# Patient Record
Sex: FEMALE | URBAN - METROPOLITAN AREA
[De-identification: names, ages, dates, MRNs, and addresses within clinical notes are randomized per-mention and may not be internally consistent; named-entity substitution may affect disease eponyms.]

---

## 2019-06-12 PROBLEM — Z00.00 ENCOUNTER FOR PREVENTIVE HEALTH EXAMINATION: Status: ACTIVE | Noted: 2019-06-12

## 2023-02-17 ENCOUNTER — OFFICE (OUTPATIENT)
Dept: URBAN - METROPOLITAN AREA CLINIC 105 | Facility: CLINIC | Age: 68
Setting detail: OPHTHALMOLOGY
End: 2023-02-17
Payer: MEDICARE

## 2023-02-17 DIAGNOSIS — H52.7: ICD-10-CM

## 2023-02-17 DIAGNOSIS — H16.223: ICD-10-CM

## 2023-02-17 DIAGNOSIS — H25.13: ICD-10-CM

## 2023-02-17 DIAGNOSIS — H35.3131: ICD-10-CM

## 2023-02-17 DIAGNOSIS — H02.403: ICD-10-CM

## 2023-02-17 DIAGNOSIS — E11.9: ICD-10-CM

## 2023-02-17 PROCEDURE — 92014 COMPRE OPH EXAM EST PT 1/>: CPT | Performed by: OPHTHALMOLOGY

## 2023-02-17 PROCEDURE — 92250 FUNDUS PHOTOGRAPHY W/I&R: CPT | Performed by: OPHTHALMOLOGY

## 2023-02-17 PROCEDURE — 92015 DETERMINE REFRACTIVE STATE: CPT | Performed by: OPHTHALMOLOGY

## 2023-02-17 ASSESSMENT — REFRACTION_CURRENTRX
OS_VPRISM_DIRECTION: PROGS
OS_CYLINDER: SPH
OS_CYLINDER: SPH
OD_VPRISM_DIRECTION: PROGS
OD_OVR_VA: 20/
OD_AXIS: 075
OS_VPRISM_DIRECTION: SV
OD_OVR_VA: 20/
OS_AXIS: 180
OD_CYLINDER: -0.50
OS_CYLINDER: SPH
OS_SPHERE: +1.50
OD_CYLINDER: SPH
OD_VPRISM_DIRECTION: SV
OD_SPHERE: +1.50
OS_OVR_VA: 20/
OD_SPHERE: +4.75
OD_CYLINDER: -0.50
OS_OVR_VA: 20/
OS_SPHERE: +5.25
OD_SPHERE: +4.75
OS_VPRISM_DIRECTION: SV
OD_AXIS: 075
OS_OVR_VA: 20/
OD_VPRISM_DIRECTION: SV
OS_SPHERE: +5.25
OS_AXIS: 180
OD_AXIS: 180
OD_OVR_VA: 20/

## 2023-02-17 ASSESSMENT — REFRACTION_MANIFEST
OD_ADD: +2.50
OS_SPHERE: +2.75
OD_CYLINDER: -0.75
OD_AXIS: 080
OD_SPHERE: +3.00
OS_VA1: 20/20-
OS_SPHERE: +2.75
OD_CYLINDER: -0.75
OS_CYLINDER: SPH
OS_VA1: 20/25
OD_SPHERE: +3.00
OD_VA1: 20/20-2
OD_VA1: 20/25
OD_ADD: +2.75
OS_ADD: +2.75
OD_AXIS: 075
OS_ADD: +2.50

## 2023-02-17 ASSESSMENT — VISUAL ACUITY
OS_BCVA: 20/70
OD_BCVA: 20/150

## 2023-02-17 ASSESSMENT — KERATOMETRY
OS_AXISANGLE_DEGREES: 073
OS_K2POWER_DIOPTERS: 44.50
OD_AXISANGLE_DEGREES: 095
OD_K1POWER_DIOPTERS: 44.00
OD_K2POWER_DIOPTERS: 44.75
OS_K1POWER_DIOPTERS: 43.75

## 2023-02-17 ASSESSMENT — TONOMETRY
OD_IOP_MMHG: 16
OS_IOP_MMHG: 20

## 2023-02-17 ASSESSMENT — REFRACTION_AUTOREFRACTION
OD_CYLINDER: -0.75
OS_CYLINDER: SPH
OS_SPHERE: +2.75
OD_AXIS: 087
OD_SPHERE: +3.00

## 2023-02-17 ASSESSMENT — LID POSITION - PTOSIS
OD_PTOSIS: RUL
OS_PTOSIS: LUL

## 2023-02-17 ASSESSMENT — CONFRONTATIONAL VISUAL FIELD TEST (CVF)
OS_FINDINGS: FULL
OD_FINDINGS: FULL

## 2023-02-17 ASSESSMENT — SPHEQUIV_DERIVED
OD_SPHEQUIV: 2.625

## 2023-02-17 ASSESSMENT — AXIALLENGTH_DERIVED
OD_AL: 22.3202

## 2023-02-21 ENCOUNTER — RX ONLY (RX ONLY)
Age: 68
End: 2023-02-21

## 2023-02-21 ENCOUNTER — OFFICE (OUTPATIENT)
Dept: URBAN - METROPOLITAN AREA CLINIC 100 | Facility: CLINIC | Age: 68
Setting detail: OPHTHALMOLOGY
End: 2023-02-21
Payer: MEDICARE

## 2023-02-21 DIAGNOSIS — H02.831: ICD-10-CM

## 2023-02-21 DIAGNOSIS — H02.834: ICD-10-CM

## 2023-02-21 DIAGNOSIS — H16.223: ICD-10-CM

## 2023-02-21 DIAGNOSIS — H53.40: ICD-10-CM

## 2023-02-21 PROCEDURE — SCCOS COSMETIC CONSULTATION: Performed by: OPHTHALMOLOGY

## 2023-02-21 PROCEDURE — 92285 EXTERNAL OCULAR PHOTOGRAPHY: CPT | Performed by: OPHTHALMOLOGY

## 2023-02-21 PROCEDURE — 92082 INTERMEDIATE VISUAL FIELD XM: CPT | Performed by: OPHTHALMOLOGY

## 2023-02-21 PROCEDURE — 99214 OFFICE O/P EST MOD 30 MIN: CPT | Performed by: OPHTHALMOLOGY

## 2023-02-21 ASSESSMENT — REFRACTION_AUTOREFRACTION
OS_CYLINDER: SPH
OD_AXIS: 087
OS_SPHERE: +2.75
OD_SPHERE: +3.00
OD_CYLINDER: -0.75

## 2023-02-21 ASSESSMENT — CONFRONTATIONAL VISUAL FIELD TEST (CVF)
OD_FINDINGS: FULL
OS_FINDINGS: FULL

## 2023-02-21 ASSESSMENT — KERATOMETRY
OS_K1POWER_DIOPTERS: 43.75
OS_AXISANGLE_DEGREES: 073
OD_K2POWER_DIOPTERS: 44.75
OD_AXISANGLE_DEGREES: 095
OS_K2POWER_DIOPTERS: 44.50
OD_K1POWER_DIOPTERS: 44.00

## 2023-02-21 ASSESSMENT — LID POSITION - DERMATOCHALASIS
OD_DERMATOCHALASIS: RUL 2+
OS_DERMATOCHALASIS: LUL 2+

## 2023-02-21 ASSESSMENT — VISUAL ACUITY
OS_BCVA: 20/60-
OD_BCVA: 20/125

## 2023-02-21 ASSESSMENT — AXIALLENGTH_DERIVED: OD_AL: 22.3202

## 2023-02-21 ASSESSMENT — SPHEQUIV_DERIVED: OD_SPHEQUIV: 2.625

## 2023-02-22 PROBLEM — H02.834 DERMATOCHALASIS; RIGHT UPPER LID, LEFT UPPER LID: Status: ACTIVE | Noted: 2023-02-21

## 2023-02-22 PROBLEM — H53.40 VISUAL FIELD DEFECT: Status: ACTIVE | Noted: 2023-02-21

## 2023-02-22 PROBLEM — H02.40 PTOSIS OF EYELID, UNSPECIFIED: Status: ACTIVE | Noted: 2023-02-21

## 2023-02-22 PROBLEM — H02.831 DERMATOCHALASIS; RIGHT UPPER LID, LEFT UPPER LID: Status: ACTIVE | Noted: 2023-02-21

## 2023-04-21 ENCOUNTER — APPOINTMENT (OUTPATIENT)
Dept: ORTHOPEDIC SURGERY | Facility: CLINIC | Age: 68
End: 2023-04-21
Payer: MEDICARE

## 2023-04-21 VITALS — BODY MASS INDEX: 31.85 KG/M2 | WEIGHT: 158 LBS | HEIGHT: 59 IN

## 2023-04-21 DIAGNOSIS — Z80.9 FAMILY HISTORY OF MALIGNANT NEOPLASM, UNSPECIFIED: ICD-10-CM

## 2023-04-21 DIAGNOSIS — E11.9 TYPE 2 DIABETES MELLITUS W/OUT COMPLICATIONS: ICD-10-CM

## 2023-04-21 DIAGNOSIS — S93.401A SPRAIN OF UNSPECIFIED LIGAMENT OF RIGHT ANKLE, INITIAL ENCOUNTER: ICD-10-CM

## 2023-04-21 DIAGNOSIS — Z78.9 OTHER SPECIFIED HEALTH STATUS: ICD-10-CM

## 2023-04-21 DIAGNOSIS — S80.01XA CONTUSION OF RIGHT KNEE, INITIAL ENCOUNTER: ICD-10-CM

## 2023-04-21 DIAGNOSIS — Z86.39 PERSONAL HISTORY OF OTHER ENDOCRINE, NUTRITIONAL AND METABOLIC DISEASE: ICD-10-CM

## 2023-04-21 PROCEDURE — 73562 X-RAY EXAM OF KNEE 3: CPT | Mod: RT

## 2023-04-21 PROCEDURE — 73610 X-RAY EXAM OF ANKLE: CPT | Mod: RT

## 2023-04-21 PROCEDURE — 99204 OFFICE O/P NEW MOD 45 MIN: CPT

## 2023-04-21 PROCEDURE — L4350: CPT | Mod: RT

## 2023-04-21 RX ORDER — METFORMIN HYDROCHLORIDE 500 MG/1
500 TABLET, COATED ORAL
Refills: 0 | Status: ACTIVE | COMMUNITY

## 2023-04-21 NOTE — IMAGING
[All Views] : anteroposterior, lateral, skyline, and anteroposterior standing [Right] : right ankle [Weight -] : weightbearing [There are no fractures, subluxations or dislocations. No significant abnormalities are seen] : There are no fractures, subluxations or dislocations. No significant abnormalities are seen [Degenerative change] : Degenerative change [FreeTextEntry9] : can not r/o occult fracture

## 2023-04-21 NOTE — REASON FOR VISIT
[FreeTextEntry2] : New Patient - RIght Ankle/Right Knee. She tripped and fell on 4/13/23. Denies any prior pain, injury or trauma.

## 2023-04-21 NOTE — DISCUSSION/SUMMARY
[de-identified] : General Dx discussion\par The patient was advised of the diagnosis. The natural history of the pathology was explained in full to the patient in layman's terms. All questions were answered. The risks and benefits of surgical and non-surgical treatment alternatives were explained in full to the patient. \par \par Case discussed.\par She will be sent for an MRI of her knee to r/o occult fracture.\par Ace bandage for comfort.\par Airsport for her ankle. \par Crutches NWB. \par Follow up after MRI.\par \par Entered by KAIDEN Graves acting as scribe.\par -\par The documentation recorded by the scribe accurately reflects the service I personally performed and the decisions made by me.\par

## 2023-04-21 NOTE — PHYSICAL EXAM
[Right] : right foot and ankle [Moderate] : moderate swelling of lateral ankle [2+] : posterior tibialis pulse: 2+ [TWNoteComboBox7] : flexion 110 degrees [de-identified] : extension 0 degrees [] : non-antalgic

## 2023-04-21 NOTE — HISTORY OF PRESENT ILLNESS
[Sudden] : sudden [10] : 10 [7] : 7 [Burning] : burning [Stabbing] : stabbing [Tightness] : tightness [Frequent] : frequent [Leisure] : leisure [Rest] : rest [Meds] : meds [Ice] : ice [Standing] : standing [Walking] : walking [Stairs] : stairs [Retired] : Work status: retired [] : Post Surgical Visit: no [FreeTextEntry1] : Right Knee/Right Ankle [FreeTextEntry3] : 04/13/23 [FreeTextEntry5] : Patient fell and twisted ankle and landed on the knee. [FreeTextEntry7] : Moves from knee to ankle [de-identified] : Patient was prescribed pain killers for the pain

## 2023-04-25 ENCOUNTER — FORM ENCOUNTER (OUTPATIENT)
Age: 68
End: 2023-04-25

## 2023-04-26 ENCOUNTER — APPOINTMENT (OUTPATIENT)
Dept: MRI IMAGING | Facility: CLINIC | Age: 68
End: 2023-04-26
Payer: MEDICARE

## 2023-04-26 PROCEDURE — 73721 MRI JNT OF LWR EXTRE W/O DYE: CPT | Mod: RT

## 2023-05-02 ENCOUNTER — APPOINTMENT (OUTPATIENT)
Dept: ORTHOPEDIC SURGERY | Facility: CLINIC | Age: 68
End: 2023-05-02
Payer: MEDICARE

## 2023-05-02 VITALS — BODY MASS INDEX: 31.85 KG/M2 | HEIGHT: 59 IN | WEIGHT: 158 LBS

## 2023-05-02 PROCEDURE — 99213 OFFICE O/P EST LOW 20 MIN: CPT

## 2023-05-02 NOTE — DATA REVIEWED
[MRI] : MRI [Right] : of the right [Knee] : knee [Report was reviewed and noted in the chart] : The report was reviewed and noted in the chart [FreeTextEntry1] : 1. Severe complex medial meniscal tearing with severe medial extrusion, complex tearing at the root posteriorly \par and severe surrounding synovitis.\par 2. Chondral loss, joint space narrowing, and osteophytes in the medial and patellofemoral compartments \par without significant subchondral edema or fracture.\par 3. Moderate effusion, synovitis, and popliteal cyst which may have ruptured with extensor mechanism \par tendinopathy, mild chronic MCL sprain with laxity, mild diffuse soft tissue swelling and bursitis anteriorly and \par a nonspecific small cyst measuring 5-6 mm above the patella centrally potentially related to slight loculated \par joint fluid in the area. \par 4. No lateral meniscal tear.

## 2023-05-02 NOTE — DISCUSSION/SUMMARY
[de-identified] : General Dx discussion\par The patient was advised of the diagnosis. The natural history of the pathology was explained in full to the patient in layman's terms. All questions were answered. The risks and benefits of surgical and non-surgical treatment alternatives were explained in full to the patient. \par \par Case discussed.\par mri reviewed \par will try a asp/injection cyst \par poss of arthroscopy discussed questions answered \par \par \par Entered by Elena RICHEY acting as scribe.\par -\par The documentation recorded by the scribe accurately reflects the service I personally performed and the decisions made by me.\par

## 2023-05-02 NOTE — HISTORY OF PRESENT ILLNESS
[Localized] : localized [Tightness] : tightness [Meds] : meds [Walking/activity] : walking/activity [Retired] : Work status: retired [Sudden] : sudden [10] : 10 [7] : 7 [Burning] : burning [Stabbing] : stabbing [Frequent] : frequent [Leisure] : leisure [Rest] : rest [Ice] : ice [Standing] : standing [Walking] : walking [Stairs] : stairs [] : Post Surgical Visit: no [FreeTextEntry1] : Right Knee/Right Ankle [FreeTextEntry3] : 04/13/23 [FreeTextEntry5] : Patient fell and twisted ankle and landed on the knee. [FreeTextEntry7] : Moves from knee to ankle [de-identified] : getting out of car [de-identified] : MRI [facility]  [de-identified] : Patient was prescribed pain killers for the pain

## 2023-05-02 NOTE — PHYSICAL EXAM
[Right] : right foot and ankle [Moderate] : moderate swelling of lateral ankle [2+] : posterior tibialis pulse: 2+ [] : no deformities of patellar tendon [TWNoteComboBox7] : flexion 110 degrees [de-identified] : extension 0 degrees

## 2023-05-04 ENCOUNTER — RESULT REVIEW (OUTPATIENT)
Age: 68
End: 2023-05-04

## 2023-05-30 ENCOUNTER — APPOINTMENT (OUTPATIENT)
Dept: ORTHOPEDIC SURGERY | Facility: CLINIC | Age: 68
End: 2023-05-30
Payer: MEDICARE

## 2023-05-30 VITALS — BODY MASS INDEX: 31.85 KG/M2 | WEIGHT: 158 LBS | HEIGHT: 59 IN

## 2023-05-30 PROCEDURE — 99213 OFFICE O/P EST LOW 20 MIN: CPT

## 2023-05-30 NOTE — DISCUSSION/SUMMARY
[de-identified] : General Dx discussion\par The patient was advised of the diagnosis. The natural history of the pathology was explained in full to the patient in layman's terms. All questions were answered. The risks and benefits of surgical and non-surgical treatment alternatives were explained in full to the patient. \par \par Feels over 80% better will monitor \par poss of surgery discussed \par \par

## 2023-05-30 NOTE — HISTORY OF PRESENT ILLNESS
[1] : 2 [Localized] : localized [Household chores] : household chores [Retired] : Work status: retired [] : no [FreeTextEntry1] : rt knee [FreeTextEntry6] : discomfort and clicking [FreeTextEntry9] : otc, cortisone and aspiration, HEP [de-identified] : bending

## 2023-05-30 NOTE — PHYSICAL EXAM
[Right] : right knee [5___] : hamstring 5[unfilled]/5 [] : light touch is intact throughout [TWNoteComboBox7] : flexion 110 degrees [de-identified] : extension 0 degrees

## 2023-06-06 ENCOUNTER — APPOINTMENT (OUTPATIENT)
Dept: ORTHOPEDIC SURGERY | Facility: CLINIC | Age: 68
End: 2023-06-06
Payer: MEDICARE

## 2023-06-06 VITALS — BODY MASS INDEX: 31.85 KG/M2 | HEIGHT: 59 IN | WEIGHT: 158 LBS

## 2023-06-06 PROCEDURE — 99213 OFFICE O/P EST LOW 20 MIN: CPT

## 2023-06-06 NOTE — DATA REVIEWED
[FreeTextEntry1] : MRI Right knee ([facility] 4/26/23)\par Impression: \par 1. Severe complex medial meniscal tearing with severe medial extrusion, complex tearing at the root posteriorly \par and severe surrounding synovitis.\par 2. Chondral loss, joint space narrowing, and osteophytes in the medial and patellofemoral compartments \par without significant subchondral edema or fracture.\par 3. Moderate effusion, synovitis, and popliteal cyst which may have ruptured with extensor mechanism \par tendinopathy, mild chronic MCL sprain with laxity, mild diffuse soft tissue swelling and bursitis anteriorly and \par a nonspecific small cyst measuring 5-6 mm above the patella centrally potentially related to slight loculated \par joint fluid in the area. \par 4. No lateral meniscal tear.

## 2023-06-06 NOTE — HISTORY OF PRESENT ILLNESS
[Localized] : localized [Tightness] : tightness [Intermittent] : intermittent [Nothing helps with pain getting better] : Nothing helps with pain getting better [Retired] : Work status: retired [Walking] : walking [1] : 2 [Household chores] : household chores [de-identified] : 67F here for follow up recurring RIGHT knee pain.  Had u/s guided asp baker's cyst 5/4/23. Pain is mostly postioer knee.  [] : no [FreeTextEntry1] : rt knee [FreeTextEntry6] : discomfort and clicking [FreeTextEntry9] : otc, cortisone and aspiration, HEP [de-identified] : bending

## 2023-06-06 NOTE — DISCUSSION/SUMMARY
[de-identified] : General Dx discussion\par The patient was advised of the diagnosis. The natural history of the pathology was explained in full to the patient in layman's terms. All questions were answered. The risks and benefits of surgical and non-surgical treatment alternatives were explained in full to the patient. \par \par Had U/S guided aspiration Baker's cyst 5/4/23 with 80% better relief, pain has now recurred. \par Will try course of PT.\par Possible repeat aspiration.\par Will monitor \par Poss of surgery discussed \par Return one month\par \par Progress note completed by RYAN Lal, under direct supervision of Dr. Milan Mendez MD\par \par

## 2023-06-06 NOTE — PHYSICAL EXAM
[Right] : right knee [5___] : hamstring 5[unfilled]/5 [] : no lateral tibial plateau tenderness [TWNoteComboBox7] : flexion 110 degrees [de-identified] : extension 0 degrees

## 2023-06-19 ENCOUNTER — FORM ENCOUNTER (OUTPATIENT)
Age: 68
End: 2023-06-19

## 2023-06-20 ENCOUNTER — OFFICE (OUTPATIENT)
Dept: URBAN - METROPOLITAN AREA CLINIC 100 | Facility: CLINIC | Age: 68
Setting detail: OPHTHALMOLOGY
End: 2023-06-20
Payer: MEDICARE

## 2023-06-20 VITALS — HEIGHT: 55 IN

## 2023-06-20 DIAGNOSIS — H53.40: ICD-10-CM

## 2023-06-20 DIAGNOSIS — H02.831: ICD-10-CM

## 2023-06-20 DIAGNOSIS — H02.834: ICD-10-CM

## 2023-06-20 DIAGNOSIS — H16.223: ICD-10-CM

## 2023-06-20 PROCEDURE — 83861 MICROFLUID ANALY TEARS: CPT | Performed by: OPHTHALMOLOGY

## 2023-06-20 PROCEDURE — 99213 OFFICE O/P EST LOW 20 MIN: CPT | Performed by: OPHTHALMOLOGY

## 2023-06-20 ASSESSMENT — LID POSITION - DERMATOCHALASIS
OD_DERMATOCHALASIS: RUL 2+
OS_DERMATOCHALASIS: LUL 2+

## 2023-06-20 ASSESSMENT — REFRACTION_AUTOREFRACTION
OD_AXIS: 087
OD_SPHERE: +3.00
OS_CYLINDER: SPH
OD_CYLINDER: -0.75
OS_SPHERE: +2.75

## 2023-06-20 ASSESSMENT — KERATOMETRY
OS_AXISANGLE_DEGREES: 073
OS_K1POWER_DIOPTERS: 43.75
OD_K1POWER_DIOPTERS: 44.00
OD_K2POWER_DIOPTERS: 44.75
OS_K2POWER_DIOPTERS: 44.50
OD_AXISANGLE_DEGREES: 095

## 2023-06-20 ASSESSMENT — VISUAL ACUITY
OD_BCVA: 20/80
OS_BCVA: 20/80-2

## 2023-06-20 ASSESSMENT — AXIALLENGTH_DERIVED: OD_AL: 22.3202

## 2023-06-20 ASSESSMENT — SPHEQUIV_DERIVED: OD_SPHEQUIV: 2.625

## 2023-06-20 ASSESSMENT — CONFRONTATIONAL VISUAL FIELD TEST (CVF)
OD_FINDINGS: FULL
OS_FINDINGS: FULL

## 2023-06-27 ENCOUNTER — ASC (OUTPATIENT)
Dept: URBAN - METROPOLITAN AREA SURGERY 8 | Facility: SURGERY | Age: 68
Setting detail: OPHTHALMOLOGY
End: 2023-06-27
Payer: MEDICARE

## 2023-06-27 DIAGNOSIS — H02.831: ICD-10-CM

## 2023-06-27 DIAGNOSIS — H02.834: ICD-10-CM

## 2023-06-27 PROCEDURE — 15823 BLEPHARP UPR EYELID XCSV SKN: CPT | Performed by: OPHTHALMOLOGY

## 2023-06-27 PROCEDURE — 15820 BLEPHAROPLASTY LOWER EYELID: CPT | Performed by: OPHTHALMOLOGY

## 2023-07-05 ENCOUNTER — OFFICE (OUTPATIENT)
Dept: URBAN - METROPOLITAN AREA CLINIC 104 | Facility: CLINIC | Age: 68
Setting detail: OPHTHALMOLOGY
End: 2023-07-05
Payer: MEDICARE

## 2023-07-05 ENCOUNTER — RX ONLY (RX ONLY)
Age: 68
End: 2023-07-05

## 2023-07-05 DIAGNOSIS — H02.831: ICD-10-CM

## 2023-07-05 DIAGNOSIS — H16.223: ICD-10-CM

## 2023-07-05 DIAGNOSIS — H02.834: ICD-10-CM

## 2023-07-05 PROCEDURE — 99024 POSTOP FOLLOW-UP VISIT: CPT | Performed by: OPHTHALMOLOGY

## 2023-07-05 ASSESSMENT — CONFRONTATIONAL VISUAL FIELD TEST (CVF)
OS_FINDINGS: FULL
OD_FINDINGS: FULL

## 2023-07-05 ASSESSMENT — LID POSITION - COMMENTS
OD_COMMENTS: WOUND C/D/I, HEALING WELL.GOOD HEIGHT AND GOOD SYMMETRY.
OS_COMMENTS: WOUND C/D/I, HEALING WELL.GOOD HEIGHT AND GOOD SYMMETRY.

## 2023-07-05 ASSESSMENT — LID POSITION - DERMATOCHALASIS
OD_DERMATOCHALASIS: ABSENT
OS_DERMATOCHALASIS: ABSENT

## 2023-07-06 ASSESSMENT — VISUAL ACUITY
OD_BCVA: 20/70-1
OS_BCVA: 20/40

## 2023-07-11 ENCOUNTER — APPOINTMENT (OUTPATIENT)
Dept: ORTHOPEDIC SURGERY | Facility: CLINIC | Age: 68
End: 2023-07-11

## 2023-07-25 ENCOUNTER — APPOINTMENT (OUTPATIENT)
Dept: ORTHOPEDIC SURGERY | Facility: CLINIC | Age: 68
End: 2023-07-25
Payer: MEDICARE

## 2023-07-25 VITALS — BODY MASS INDEX: 31.85 KG/M2 | WEIGHT: 158 LBS | HEIGHT: 59 IN

## 2023-07-25 PROCEDURE — 99213 OFFICE O/P EST LOW 20 MIN: CPT

## 2023-07-25 NOTE — HISTORY OF PRESENT ILLNESS
[0] : 0 [Radiating] : radiating [Intermittent] : intermittent [Injection therapy] : injection therapy [Physical therapy] : physical therapy [Retired] : Work status: retired [de-identified] : 67F here for follow up recurring RIGHT knee pain.  Had u/s guided asp baker's cyst 5/4/23. She is doing ok, but still has some pain. Pain is medial.  [] : no [FreeTextEntry1] : rt knee [FreeTextEntry7] : back of knee [FreeTextEntry9] : some movement/stretching [de-identified] : stepping the wrong way

## 2023-07-25 NOTE — PHYSICAL EXAM
[Right] : right knee [5___] : hamstring 5[unfilled]/5 [] : no lateral tibial plateau tenderness [TWNoteComboBox7] : flexion 110 degrees [de-identified] : extension 0 degrees

## 2023-07-25 NOTE — DISCUSSION/SUMMARY
[de-identified] : General Dx discussion\par The patient was advised of the diagnosis. The natural history of the pathology was explained in full to the patient in layman's terms. All questions were answered. The risks and benefits of surgical and non-surgical treatment alternatives were explained in full to the patient. \par \par Case Discussed.\par Surg/Nonsurg options discussed.\par She does not wish to have surgery at this time. \par Therefore recommend and request authorization for orthovisc injections right knee. \par f/u once approved. \par \par Progress note completed by Elena DAVIS, under direct supervision of Dr. Milan Mendez MD\par \par

## 2023-07-26 RX ORDER — HYALURONATE SODIUM 30 MG/2 ML
30 SYRINGE (ML) INTRAARTICULAR
Qty: 4 | Refills: 0 | Status: COMPLETED | COMMUNITY
Start: 2023-07-26 | End: 2023-08-23

## 2023-09-19 ENCOUNTER — APPOINTMENT (OUTPATIENT)
Dept: ORTHOPEDIC SURGERY | Facility: CLINIC | Age: 68
End: 2023-09-19
Payer: OTHER MISCELLANEOUS

## 2023-09-19 VITALS — WEIGHT: 158 LBS | BODY MASS INDEX: 31.85 KG/M2 | HEIGHT: 59 IN

## 2023-09-19 DIAGNOSIS — M25.511 PAIN IN RIGHT SHOULDER: ICD-10-CM

## 2023-09-19 DIAGNOSIS — M75.111 INCOMPLETE ROTATOR CUFF TEAR OR RUPTURE OF RIGHT SHOULDER, NOT SPECIFIED AS TRAUMATIC: ICD-10-CM

## 2023-09-19 PROCEDURE — 99214 OFFICE O/P EST MOD 30 MIN: CPT

## 2023-09-19 PROCEDURE — 73030 X-RAY EXAM OF SHOULDER: CPT | Mod: RT

## 2023-09-19 RX ORDER — CYCLOBENZAPRINE HYDROCHLORIDE 5 MG/1
5 TABLET, FILM COATED ORAL
Qty: 30 | Refills: 0 | Status: COMPLETED | COMMUNITY
Start: 2023-09-19 | End: 2023-10-19

## 2023-09-19 RX ORDER — MELOXICAM 15 MG/1
15 TABLET ORAL
Qty: 30 | Refills: 0 | Status: ACTIVE | COMMUNITY
Start: 2023-09-19 | End: 1900-01-01

## 2023-09-20 ENCOUNTER — OFFICE (OUTPATIENT)
Dept: URBAN - METROPOLITAN AREA CLINIC 104 | Facility: CLINIC | Age: 68
Setting detail: OPHTHALMOLOGY
End: 2023-09-20
Payer: MEDICARE

## 2023-09-20 VITALS — HEIGHT: 55 IN

## 2023-09-20 DIAGNOSIS — H02.834: ICD-10-CM

## 2023-09-20 DIAGNOSIS — H16.223: ICD-10-CM

## 2023-09-20 DIAGNOSIS — H02.831: ICD-10-CM

## 2023-09-20 PROCEDURE — 99024 POSTOP FOLLOW-UP VISIT: CPT | Performed by: OPHTHALMOLOGY

## 2023-09-20 ASSESSMENT — LID POSITION - COMMENTS
OS_COMMENTS: GOOD HEIGHT AND GOOD SYMMETRY.
OD_COMMENTS: GOOD HEIGHT AND GOOD SYMMETRY.

## 2023-09-20 ASSESSMENT — CONFRONTATIONAL VISUAL FIELD TEST (CVF)
OD_FINDINGS: FULL
OS_FINDINGS: FULL

## 2023-09-20 ASSESSMENT — VISUAL ACUITY
OS_BCVA: 20/25
OD_BCVA: 20/20

## 2023-09-20 ASSESSMENT — LID POSITION - DERMATOCHALASIS
OS_DERMATOCHALASIS: ABSENT
OD_DERMATOCHALASIS: ABSENT

## 2024-04-23 ENCOUNTER — APPOINTMENT (OUTPATIENT)
Dept: ORTHOPEDIC SURGERY | Facility: CLINIC | Age: 69
End: 2024-04-23
Payer: MEDICARE

## 2024-04-23 VITALS — BODY MASS INDEX: 31.25 KG/M2 | HEIGHT: 59 IN | WEIGHT: 155 LBS

## 2024-04-23 DIAGNOSIS — S83.241D OTHER TEAR OF MEDIAL MENISCUS, CURRENT INJURY, RIGHT KNEE, SUBSEQUENT ENCOUNTER: ICD-10-CM

## 2024-04-23 DIAGNOSIS — M71.21 SYNOVIAL CYST OF POPLITEAL SPACE [BAKER], RIGHT KNEE: ICD-10-CM

## 2024-04-23 PROCEDURE — 99213 OFFICE O/P EST LOW 20 MIN: CPT

## 2024-04-23 NOTE — PHYSICAL EXAM
[Right] : right knee [5___] : hamstring 5[unfilled]/5 [] : no lateral tibial plateau tenderness [TWNoteComboBox7] : flexion 110 degrees [de-identified] : extension 0 degrees

## 2024-04-23 NOTE — DISCUSSION/SUMMARY
[de-identified] : General Dx discussion The patient was advised of the diagnosis. The natural history of the pathology was explained in full to the patient in layman's terms. All questions were answered. The risks and benefits of surgical and non-surgical treatment alternatives were explained in full to the patient.   Case Discussed. She has no meniscal symptoms today. symptoms more oa.  Recommend repeat bakers cyst aspiration/injection.  Also recommend and request authorization for gel one injection right knee.  f/u once approved.    Progress note completed by Elena DAVIS, under direct supervision of Dr. Milan Mendez MD

## 2024-04-23 NOTE — HISTORY OF PRESENT ILLNESS
[8] : 8 [7] : 7 [Localized] : localized [Constant] : constant [Meds] : meds [Exercising] : exercising [Retired] : Work status: retired [de-identified] : Here for f/u right knee. She c/o "clicking" with pain to her knee. No new injury. She had bakers cyst aspirated last year with some relief.  [] : no [FreeTextEntry1] : rt knee [FreeTextEntry6] : clicking [de-identified] : squatting [de-identified] : PT in past made pain worse

## 2024-04-24 RX ORDER — HYALURONATE SODIUM, STABILIZED 60 MG/3 ML
60 SYRINGE (ML) INTRAARTICULAR
Qty: 1 | Refills: 0 | Status: COMPLETED | COMMUNITY
Start: 2024-04-24 | End: 2024-05-01

## 2024-05-14 ENCOUNTER — APPOINTMENT (OUTPATIENT)
Dept: ORTHOPEDIC SURGERY | Facility: CLINIC | Age: 69
End: 2024-05-14
Payer: MEDICARE

## 2024-05-14 VITALS — HEIGHT: 59 IN | BODY MASS INDEX: 31.25 KG/M2 | WEIGHT: 155 LBS

## 2024-05-14 PROCEDURE — 20611 DRAIN/INJ JOINT/BURSA W/US: CPT | Mod: RT

## 2024-05-14 PROCEDURE — 99212 OFFICE O/P EST SF 10 MIN: CPT | Mod: 25

## 2024-05-14 NOTE — DISCUSSION/SUMMARY
[de-identified] : General Dx discussion The patient was advised of the diagnosis. The natural history of the pathology was explained in full to the patient in layman's terms. All questions were answered. The risks and benefits of surgical and non-surgical treatment alternatives were explained in full to the patient.   Case Discussed. Durolane tolerated well. f/u 6 weeks.    Progress note completed by Elena DAVIS, under direct supervision of Dr. Milan Mendez MD

## 2024-05-14 NOTE — PHYSICAL EXAM
[Right] : right knee [5___] : hamstring 5[unfilled]/5 [] : light touch is intact throughout [TWNoteComboBox7] : flexion 110 degrees [de-identified] : extension 0 degrees

## 2024-05-14 NOTE — PROCEDURE
[Large Joint Injection] : Large joint injection [Right] : of the right [Knee] : knee [Pain] : pain [Inflammation] : inflammation [X-ray evidence of Osteoarthritis on this or prior visit] : x-ray evidence of Osteoarthritis on this or prior visit [Alcohol] : alcohol [Betadine] : betadine [Ethyl Chloride sprayed topically] : ethyl chloride sprayed topically [Sterile technique used] : sterile technique used [Durolane (60mg)] : 60mg of Durolane [] : Patient tolerated procedure well [Call if redness, pain or fever occur] : call if redness, pain or fever occur [Apply ice for 15min out of every hour for the next 12-24 hours as tolerated] : apply ice for 15 minutes out of every hour for the next 12-24 hours as tolerated [Previous OTC use and PT nontherapeutic] : patient has tried OTC's including aspirin, Ibuprofen, Aleve, etc or prescription NSAIDS, and/or exercises at home and/or physical therapy without satisfactory response [Patient had decreased mobility in the joint] : patient had decreased mobility in the joint [Risks, benefits, alternatives discussed / Verbal consent obtained] : the risks benefits, and alternatives have been discussed, and verbal consent was obtained [Prior failure or difficult injection] : prior failure or difficult injection [Altered anatomic landmarks d/t erosive arthritis] : altered anatomic landmarks d/t erosive arthritis [All ultrasound images have been permanently captured and stored accordingly in our picture archiving and communication system] : All ultrasound images have been permanently captured and stored accordingly in our picture archiving and communication system [Visualization of the needle and placement of injection was performed without complication] : visualization of the needle and placement of injection was performed without complication

## 2024-05-14 NOTE — HISTORY OF PRESENT ILLNESS
[8] : 8 [7] : 7 [Localized] : localized [Intermittent] : intermittent [Meds] : meds [Retired] : Work status: retired [de-identified] : Here for f/u right knee for Durolane injection.  [] : no [FreeTextEntry1] : rt knee [de-identified] : over use

## 2024-06-25 ENCOUNTER — APPOINTMENT (OUTPATIENT)
Dept: ORTHOPEDIC SURGERY | Facility: CLINIC | Age: 69
End: 2024-06-25
Payer: MEDICARE

## 2024-06-25 VITALS — HEIGHT: 59 IN | BODY MASS INDEX: 31.25 KG/M2 | WEIGHT: 155 LBS

## 2024-06-25 DIAGNOSIS — M17.11 UNILATERAL PRIMARY OSTEOARTHRITIS, RIGHT KNEE: ICD-10-CM

## 2024-06-25 PROCEDURE — 99213 OFFICE O/P EST LOW 20 MIN: CPT

## 2024-07-23 ENCOUNTER — OFFICE (OUTPATIENT)
Dept: URBAN - METROPOLITAN AREA CLINIC 12 | Facility: CLINIC | Age: 69
Setting detail: OPHTHALMOLOGY
End: 2024-07-23
Payer: MEDICARE

## 2024-07-23 DIAGNOSIS — H16.223: ICD-10-CM

## 2024-07-23 DIAGNOSIS — H35.3131: ICD-10-CM

## 2024-07-23 DIAGNOSIS — E11.9: ICD-10-CM

## 2024-07-23 DIAGNOSIS — H40.013: ICD-10-CM

## 2024-07-23 PROCEDURE — 92250 FUNDUS PHOTOGRAPHY W/I&R: CPT | Performed by: STUDENT IN AN ORGANIZED HEALTH CARE EDUCATION/TRAINING PROGRAM

## 2024-07-23 PROCEDURE — 92014 COMPRE OPH EXAM EST PT 1/>: CPT | Performed by: STUDENT IN AN ORGANIZED HEALTH CARE EDUCATION/TRAINING PROGRAM

## 2024-07-23 ASSESSMENT — LID POSITION - COMMENTS
OS_COMMENTS: GOOD HEIGHT AND GOOD SYMMETRY.
OD_COMMENTS: GOOD HEIGHT AND GOOD SYMMETRY.

## 2024-07-23 ASSESSMENT — LID POSITION - DERMATOCHALASIS
OD_DERMATOCHALASIS: ABSENT
OS_DERMATOCHALASIS: ABSENT

## 2024-07-23 ASSESSMENT — CONFRONTATIONAL VISUAL FIELD TEST (CVF)
OD_FINDINGS: FULL
OS_FINDINGS: FULL

## 2024-08-20 ENCOUNTER — OFFICE (OUTPATIENT)
Dept: URBAN - METROPOLITAN AREA CLINIC 100 | Facility: CLINIC | Age: 69
Setting detail: OPHTHALMOLOGY
End: 2024-08-20
Payer: MEDICARE

## 2024-08-20 DIAGNOSIS — H02.834: ICD-10-CM

## 2024-08-20 DIAGNOSIS — H02.831: ICD-10-CM

## 2024-08-20 PROCEDURE — 99213 OFFICE O/P EST LOW 20 MIN: CPT | Performed by: OPHTHALMOLOGY

## 2024-08-20 ASSESSMENT — LID POSITION - COMMENTS
OD_COMMENTS: GOOD HEIGHT AND GOOD SYMMETRY.
OS_COMMENTS: GOOD HEIGHT AND GOOD SYMMETRY.

## 2024-08-20 ASSESSMENT — CONFRONTATIONAL VISUAL FIELD TEST (CVF)
OD_FINDINGS: FULL
OS_FINDINGS: FULL

## 2024-08-20 ASSESSMENT — LID POSITION - DERMATOCHALASIS
OS_DERMATOCHALASIS: ABSENT
OD_DERMATOCHALASIS: ABSENT

## 2024-10-04 ENCOUNTER — RX ONLY (RX ONLY)
Age: 69
End: 2024-10-04

## 2024-10-04 ENCOUNTER — OFFICE (OUTPATIENT)
Dept: URBAN - METROPOLITAN AREA CLINIC 104 | Facility: CLINIC | Age: 69
Setting detail: OPHTHALMOLOGY
End: 2024-10-04
Payer: MEDICARE

## 2024-10-04 DIAGNOSIS — H02.834: ICD-10-CM

## 2024-10-04 PROCEDURE — N/C NO CHARGE: Mod: LT | Performed by: OPHTHALMOLOGY

## 2024-10-04 ASSESSMENT — KERATOMETRY
OS_K2POWER_DIOPTERS: 44.75
OS_K1POWER_DIOPTERS: 44.25
OD_K1POWER_DIOPTERS: 43.50
OS_AXISANGLE_DEGREES: 091
METHOD_AUTO_MANUAL: AUTO
OD_K2POWER_DIOPTERS: 44.00
OD_AXISANGLE_DEGREES: 041

## 2024-10-04 ASSESSMENT — REFRACTION_AUTOREFRACTION
OS_AXIS: 118
OD_SPHERE: +3.75
OD_CYLINDER: -1.25
OS_SPHERE: +3.00
OD_AXIS: 091
OS_CYLINDER: -0.25

## 2024-10-04 ASSESSMENT — VISUAL ACUITY
OS_BCVA: 20/20-
OD_BCVA: 20/20-

## 2024-10-15 ENCOUNTER — OFFICE (OUTPATIENT)
Dept: URBAN - METROPOLITAN AREA CLINIC 100 | Facility: CLINIC | Age: 69
Setting detail: OPHTHALMOLOGY
End: 2024-10-15
Payer: MEDICARE

## 2024-10-15 DIAGNOSIS — H02.834: ICD-10-CM

## 2024-10-15 DIAGNOSIS — H02.831: ICD-10-CM

## 2024-10-15 PROCEDURE — 99024 POSTOP FOLLOW-UP VISIT: CPT | Performed by: OPHTHALMOLOGY

## 2024-10-15 ASSESSMENT — LID POSITION - COMMENTS: OD_COMMENTS: WOUND C/D/I HEALING WELLÃ‚Â­GOOD HEIGHT AND GOOD SYMMETRYÃ‚Â­

## 2024-10-15 ASSESSMENT — VISUAL ACUITY
OS_BCVA: 20/25-2
OD_BCVA: 20/30-

## 2024-10-15 ASSESSMENT — TEAR BREAK UP TIME (TBUT)
OS_TBUT: 1 SEC
OD_TBUT: 1 SEC

## 2024-10-15 ASSESSMENT — REFRACTION_AUTOREFRACTION
OD_SPHERE: +3.75
OS_CYLINDER: -0.25
OS_AXIS: 118
OD_AXIS: 091
OD_CYLINDER: -1.25
OS_SPHERE: +3.00

## 2024-10-15 ASSESSMENT — KERATOMETRY
OD_AXISANGLE_DEGREES: 041
OS_AXISANGLE_DEGREES: 091
OD_K1POWER_DIOPTERS: 43.50
OS_K1POWER_DIOPTERS: 44.25
OD_K2POWER_DIOPTERS: 44.00
METHOD_AUTO_MANUAL: AUTO
OS_K2POWER_DIOPTERS: 44.75

## 2024-10-15 ASSESSMENT — SUPERFICIAL PUNCTATE KERATITIS (SPK)
OS_SPK: 1+
OD_SPK: 1+

## 2024-10-15 ASSESSMENT — LID POSITION - DERMATOCHALASIS
OS_DERMATOCHALASIS: ABSENT
OD_DERMATOCHALASIS: ABSENT

## 2024-10-15 ASSESSMENT — CONFRONTATIONAL VISUAL FIELD TEST (CVF)
OS_FINDINGS: FULL
OD_FINDINGS: FULL

## 2024-10-23 ENCOUNTER — OFFICE (OUTPATIENT)
Dept: URBAN - METROPOLITAN AREA CLINIC 12 | Facility: CLINIC | Age: 69
Setting detail: OPHTHALMOLOGY
End: 2024-10-23
Payer: MEDICARE

## 2024-10-23 ENCOUNTER — RX ONLY (RX ONLY)
Age: 69
End: 2024-10-23

## 2024-10-23 DIAGNOSIS — H52.4: ICD-10-CM

## 2024-10-23 DIAGNOSIS — H16.223: ICD-10-CM

## 2024-10-23 DIAGNOSIS — H04.123: ICD-10-CM

## 2024-10-23 PROCEDURE — 92012 INTRM OPH EXAM EST PATIENT: CPT | Performed by: STUDENT IN AN ORGANIZED HEALTH CARE EDUCATION/TRAINING PROGRAM

## 2024-10-23 PROCEDURE — 92015 DETERMINE REFRACTIVE STATE: CPT | Performed by: STUDENT IN AN ORGANIZED HEALTH CARE EDUCATION/TRAINING PROGRAM

## 2024-10-23 ASSESSMENT — KERATOMETRY
OS_AXISANGLE_DEGREES: 092
OD_K1POWER_DIOPTERS: 43.75
OS_K1POWER_DIOPTERS: 44.00
METHOD_AUTO_MANUAL: AUTO
OS_K2POWER_DIOPTERS: 44.25
OD_AXISANGLE_DEGREES: 025
OD_K2POWER_DIOPTERS: 44.00

## 2024-10-23 ASSESSMENT — REFRACTION_AUTOREFRACTION
OD_SPHERE: +4.00
OD_CYLINDER: -1.50
OS_AXIS: 091
OS_CYLINDER: -0.50
OD_AXIS: 090
OS_SPHERE: +3.00

## 2024-10-23 ASSESSMENT — REFRACTION_CURRENTRX
OD_VPRISM_DIRECTION: PROGS
OS_OVR_VA: 20/
OD_OVR_VA: 20/
OS_VPRISM_DIRECTION: PROGS
OD_CYLINDER: -0.75
OS_SPHERE: +2.75
OD_SPHERE: +3.00
OS_CYLINDER: 0.00
OD_AXIS: 074
OS_ADD: +2.75
OD_ADD: +2.75
OS_AXIS: 000

## 2024-10-23 ASSESSMENT — REFRACTION_MANIFEST
OD_ADD: +3.00
OS_ADD: +3.00
OD_CYLINDER: -1.50
OS_SPHERE: +2.75
OS_VA1: 20/20
OD_VA1: 20/20
OD_AXIS: 095
OS_CYLINDER: SPH
OD_SPHERE: +3.75

## 2024-10-23 ASSESSMENT — VISUAL ACUITY
OD_BCVA: 20/30+2
OS_BCVA: 20/30

## 2024-10-23 ASSESSMENT — TONOMETRY
OD_IOP_MMHG: 18
OS_IOP_MMHG: 20

## 2024-10-23 ASSESSMENT — CONFRONTATIONAL VISUAL FIELD TEST (CVF)
OD_FINDINGS: FULL
OS_FINDINGS: FULL

## 2024-10-23 ASSESSMENT — LID POSITION - DERMATOCHALASIS
OS_DERMATOCHALASIS: ABSENT
OD_DERMATOCHALASIS: ABSENT

## 2024-10-23 ASSESSMENT — TEAR BREAK UP TIME (TBUT)
OS_TBUT: 1 SEC
OD_TBUT: 1 SEC

## 2024-10-23 ASSESSMENT — LID POSITION - COMMENTS: OD_COMMENTS: WOUND C/D/I HEALING WELLÃ‚Â­GOOD HEIGHT AND GOOD SYMMETRYÃ‚Â­

## 2024-10-23 ASSESSMENT — SUPERFICIAL PUNCTATE KERATITIS (SPK)
OD_SPK: 1+
OS_SPK: 1+

## 2024-11-09 ENCOUNTER — OFFICE (OUTPATIENT)
Dept: URBAN - METROPOLITAN AREA CLINIC 12 | Facility: CLINIC | Age: 69
Setting detail: OPHTHALMOLOGY
End: 2024-11-09
Payer: COMMERCIAL

## 2024-11-09 DIAGNOSIS — H52.7: ICD-10-CM

## 2024-11-09 PROCEDURE — RX/CHECK RX/CHECK: Performed by: STUDENT IN AN ORGANIZED HEALTH CARE EDUCATION/TRAINING PROGRAM

## 2024-11-09 ASSESSMENT — REFRACTION_MANIFEST
OS_VA1: 20/20
OD_VA1: 20/20
OD_ADD: +3.00
OD_AXIS: 090
OS_ADD: +3.00
OD_SPHERE: +3.25
OS_VA1: 20/20
OD_VA1: 20/20
OS_ADD: +3.00
OD_CYLINDER: -1.00
OS_CYLINDER: SPH
OD_CYLINDER: -1.50
OS_SPHERE: +2.75
OD_ADD: +3.00
OS_SPHERE: +2.75
OS_CYLINDER: SPH
OD_AXIS: 095
OD_SPHERE: +3.75

## 2024-11-09 ASSESSMENT — LID POSITION - COMMENTS: OD_COMMENTS: WOUND C/D/I HEALING WELLÃ‚Â­GOOD HEIGHT AND GOOD SYMMETRYÃ‚Â­

## 2024-11-09 ASSESSMENT — REFRACTION_AUTOREFRACTION
OD_SPHERE: +3.50
OD_AXIS: 092
OS_AXIS: 000
OD_CYLINDER: -1.00
OS_SPHERE: +3.25
OS_CYLINDER: SPHERE

## 2024-11-09 ASSESSMENT — REFRACTION_CURRENTRX
OS_SPHERE: +2.75
OD_SPHERE: +3.00
OS_AXIS: 000
OS_VPRISM_DIRECTION: PROGS
OD_OVR_VA: 20/
OS_CYLINDER: 0.00
OD_CYLINDER: -0.75
OS_OVR_VA: 20/
OS_ADD: +2.75
OD_VPRISM_DIRECTION: PROGS
OD_ADD: +2.75
OD_AXIS: 072

## 2024-11-09 ASSESSMENT — KERATOMETRY
OS_K1POWER_DIOPTERS: 44.25
METHOD_AUTO_MANUAL: AUTO
OS_AXISANGLE_DEGREES: 087
OD_K2POWER_DIOPTERS: 44.00
OD_AXISANGLE_DEGREES: 039
OD_K1POWER_DIOPTERS: 43.75
OS_K2POWER_DIOPTERS: 44.50

## 2024-11-09 ASSESSMENT — TEAR BREAK UP TIME (TBUT)
OS_TBUT: 1 SEC
OD_TBUT: 1 SEC

## 2024-11-09 ASSESSMENT — LID POSITION - DERMATOCHALASIS
OS_DERMATOCHALASIS: ABSENT
OD_DERMATOCHALASIS: ABSENT

## 2024-11-09 ASSESSMENT — TONOMETRY
OD_IOP_MMHG: 17
OS_IOP_MMHG: 19

## 2024-11-09 ASSESSMENT — CONFRONTATIONAL VISUAL FIELD TEST (CVF)
OD_FINDINGS: FULL
OS_FINDINGS: FULL

## 2024-11-09 ASSESSMENT — SUPERFICIAL PUNCTATE KERATITIS (SPK)
OD_SPK: 1+
OS_SPK: 1+

## 2024-11-09 ASSESSMENT — VISUAL ACUITY
OD_BCVA: 20/25+2
OS_BCVA: 20/25-2

## 2025-03-25 PROBLEM — Z00.00 ENCOUNTER FOR PREVENTIVE HEALTH EXAMINATION: Status: ACTIVE | Noted: 2025-03-25

## 2025-03-27 ENCOUNTER — APPOINTMENT (OUTPATIENT)
Dept: PULMONOLOGY | Facility: CLINIC | Age: 70
End: 2025-03-27
Payer: MEDICARE

## 2025-03-27 RX ORDER — ALBUTEROL SULFATE 2.5 MG/3ML
(2.5 MG/3ML) SOLUTION RESPIRATORY (INHALATION)
Qty: 0 | Refills: 0 | Status: COMPLETED | OUTPATIENT
Start: 2025-03-27

## 2025-03-27 RX ADMIN — Medication 0 0.083%: at 00:00
